# Patient Record
Sex: MALE | Race: BLACK OR AFRICAN AMERICAN | URBAN - METROPOLITAN AREA
[De-identification: names, ages, dates, MRNs, and addresses within clinical notes are randomized per-mention and may not be internally consistent; named-entity substitution may affect disease eponyms.]

---

## 2020-07-15 ENCOUNTER — HOSPITAL ENCOUNTER (OUTPATIENT)
Dept: INTENSIVE CARE | Facility: HOSPITAL | Age: 50
End: 2020-07-16
Attending: INTERNAL MEDICINE | Admitting: INTERNAL MEDICINE

## 2020-07-15 LAB
ABS NEUT (OLG): 5.17 X10(3)/MCL (ref 2.1–9.2)
ALBUMIN SERPL-MCNC: 3.8 GM/DL (ref 3.5–5)
ALBUMIN/GLOB SERPL: 1.1 RATIO (ref 1.1–2)
ALP SERPL-CCNC: 66 UNIT/L (ref 40–150)
ALT SERPL-CCNC: 25 UNIT/L (ref 0–55)
AMPHET UR QL SCN: NEGATIVE
APTT PPP: 52.3 SECOND(S) (ref 23.2–33.7)
AST SERPL-CCNC: 33 UNIT/L (ref 5–34)
BARBITURATE SCN PRESENT UR: NEGATIVE
BASOPHILS # BLD AUTO: 0 X10(3)/MCL (ref 0–0.2)
BASOPHILS NFR BLD AUTO: 0 %
BENZODIAZ UR QL SCN: NEGATIVE
BILIRUB SERPL-MCNC: 0.5 MG/DL
BILIRUBIN DIRECT+TOT PNL SERPL-MCNC: 0.1 MG/DL (ref 0–0.5)
BILIRUBIN DIRECT+TOT PNL SERPL-MCNC: 0.4 MG/DL (ref 0–0.8)
BUN SERPL-MCNC: 14.8 MG/DL (ref 8.4–25.7)
CALCIUM SERPL-MCNC: 8.9 MG/DL (ref 8.4–10.2)
CANNABINOIDS UR QL SCN: POSITIVE
CHLORIDE SERPL-SCNC: 103 MMOL/L (ref 98–107)
CHOLEST SERPL-MCNC: 197 MG/DL
CHOLEST/HDLC SERPL: 4 {RATIO} (ref 0–5)
CK SERPL-CCNC: 232 U/L (ref 30–200)
CO2 SERPL-SCNC: 23 MMOL/L (ref 22–29)
COCAINE UR QL SCN: POSITIVE
CREAT SERPL-MCNC: 0.85 MG/DL (ref 0.73–1.18)
CRP SERPL HS-MCNC: 0.67 MG/DL
EOSINOPHIL # BLD AUTO: 0.2 X10(3)/MCL (ref 0–0.9)
EOSINOPHIL NFR BLD AUTO: 2 %
ERYTHROCYTE [DISTWIDTH] IN BLOOD BY AUTOMATED COUNT: 13.5 % (ref 11.5–17)
ERYTHROCYTE [SEDIMENTATION RATE] IN BLOOD: 2 MM/HR (ref 0–15)
EST. AVERAGE GLUCOSE BLD GHB EST-MCNC: 102.5 MG/DL
GLOBULIN SER-MCNC: 3.6 GM/DL (ref 2.4–3.5)
GLUCOSE SERPL-MCNC: 67 MG/DL (ref 74–100)
HBA1C MFR BLD: 5.2 %
HCT VFR BLD AUTO: 50.4 % (ref 42–52)
HDLC SERPL-MCNC: 56 MG/DL (ref 35–60)
HGB BLD-MCNC: 16.7 GM/DL (ref 14–18)
INR PPP: 1 (ref 0–1.3)
LDLC SERPL CALC-MCNC: 123 MG/DL (ref 50–140)
LYMPHOCYTES # BLD AUTO: 1.8 X10(3)/MCL (ref 0.6–4.6)
LYMPHOCYTES NFR BLD AUTO: 24 %
MCH RBC QN AUTO: 29.8 PG (ref 27–31)
MCHC RBC AUTO-ENTMCNC: 33.1 GM/DL (ref 33–36)
MCV RBC AUTO: 89.8 FL (ref 80–94)
MONOCYTES # BLD AUTO: 0.5 X10(3)/MCL (ref 0.1–1.3)
MONOCYTES NFR BLD AUTO: 6 %
NEUTROPHILS # BLD AUTO: 5.17 X10(3)/MCL (ref 2.1–9.2)
NEUTROPHILS NFR BLD AUTO: 67 %
OPIATES UR QL SCN: NEGATIVE
PCP UR QL: NEGATIVE
PH UR STRIP.AUTO: 5 [PH] (ref 5–8)
PLATELET # BLD AUTO: 337 X10(3)/MCL (ref 130–400)
PMV BLD AUTO: 9.8 FL (ref 9.4–12.4)
POTASSIUM SERPL-SCNC: 4.4 MMOL/L (ref 3.5–5.1)
PROT SERPL-MCNC: 7.4 GM/DL (ref 6.4–8.3)
PROTHROMBIN TIME: 12.2 SECOND(S) (ref 11.1–13.7)
RBC # BLD AUTO: 5.61 X10(6)/MCL (ref 4.7–6.1)
SODIUM SERPL-SCNC: 136 MMOL/L (ref 136–145)
SP GR FLD REFRACTOMETRY: 1 (ref 1–1)
TRIGL SERPL-MCNC: 91 MG/DL (ref 34–140)
TROPONIN I SERPL-MCNC: 0 NG/ML (ref 0–0.04)
VLDLC SERPL CALC-MCNC: 18 MG/DL
WBC # SPEC AUTO: 7.7 X10(3)/MCL (ref 4.5–11.5)

## 2022-04-30 NOTE — ED PROVIDER NOTES
Patient:   Hair Nielsen             MRN: [id number]            FIN: [id number]               Age:   49 years     Sex:  Male     :  1970   Associated Diagnoses:   Hypertension; CVA (cerebrovascular accident); Hypertension; CVA (cerebrovascular accident)   Author:   Tommie MÉNDEZ, Kyler DEVINE      Basic Information   Time seen: Date & time 7/15/2020 12:06:00.   History source: Patient.   Arrival mode: Private vehicle, walking.   History limitation: None.   Additional information: Patient's physician(s): None, Chief Complaint from Nursing Triage Note : Chief Complaint   7/15/2020 12:04 CDT      Chief Complaint           c/o dizziness and intermittent numbness to L arm, L leg and lips since yesterday around 1600. denies numbness at this time; HX of HTN; reports has not taken HTN meds for over 1 year.  .      History of Present Illness   The patient presents with dizziness, Patient states intermittent numbness to his left arm and leg and his lips starting yesterday. states that he has had dizziness intermittently (debra, SOL). and Patient presents yesterday afternoon he was watching TV when he had an episode of perioral numbness and tingling as well as left upper extremity numbness and tingling and some weakness in the left side.  It lasted approximate half an hour with some dizziness.  This morning he woke up with similar symptoms.  He continues to have the numbness and tingling however he isn't having weakness anymore.  He has noticed that he has weakness with ambulation.  He was unable to ambulate from the car to the triage room and required a wheelchair.  He reports the majority of his recent symptoms have resolved by now.  About his lifestyle habits.  He does smoke and drink and do drugs.  His drugs of choice involve cocaine and marijuana use.  He does admit to his last use being yesterday..  The onset was 1  days ago.  The course/duration of symptoms is improving.  The character of symptoms is  lightheaded and off-balance.  The degree at present is minimal.  The exacerbating factor is Walking.  The relieving factor is lying down.  Risk factors consist of hypertension, smoking and Drug abuse.  Prior episodes: none and Yesterday was 1st episode.  Therapy today: none.  Associated symptoms: focal weakness Extremities, altered sensation (left, Extremities), denies chest pain, denies nausea, denies vomiting and denies headache.        Review of Systems   Constitutional symptoms:  Negative except as documented in HPI.   Skin symptoms:  Negative except as documented in HPI.   Eye symptoms:  Negative except as documented in HPI.   ENMT symptoms:  Negative except as documented in HPI.   Respiratory symptoms:  No shortness of breath,    Cardiovascular symptoms:  Negative except as documented in HPI.   Gastrointestinal symptoms:  Negative except as documented in HPI.   Genitourinary symptoms:  Negative except as documented in HPI.   Musculoskeletal symptoms:  Negative except as documented in HPI.   Neurologic symptoms:  Dizziness, numbness, tingling, weakness.    Psychiatric symptoms:  Negative except as documented in HPI.   Endocrine symptoms:  Negative except as documented in HPI.   Hematologic/Lymphatic symptoms:  Negative except as documented in HPI.   Allergy/immunologic symptoms:  Negative except as documented in HPI.      Health Status   Allergies:    Allergic Reactions (Selected)  No Known Allergies.      Past Medical/ Family/ Social History   Medical history:    Resolved  Hypertension ([id number]):  Resolved..   Surgical history:    No active procedure history items have been selected or recorded..   Family history:    No family history items have been selected or recorded..   Social history: Alcohol use: Regularly, Tobacco use: Regularly, Drug use: Cocaine, marijuana, Occupation: Unemployed, Family/social situation: Unmarried.      Physical Examination               Vital Signs   Vital Signs   7/15/2020 16:03  CDT      Peripheral Pulse Rate     67 bpm                             Respiratory Rate          18 br/min                             SpO2                      99 %                             Systolic Blood Pressure   167 mmHg  HI                             Diastolic Blood Pressure  115 mmHg  HI                             Mean Arterial Pressure, Cuff              132 mmHg    7/15/2020 12:04 CDT      Temperature Oral          36.9 DegC                             Temperature Oral (calculated)             98.42 DegF                             Peripheral Pulse Rate     68 bpm                             Respiratory Rate          20 br/min                             SpO2                      99 %                             Oxygen Therapy            Room air                             Systolic Blood Pressure   156 mmHg  HI                             Diastolic Blood Pressure  111 mmHg  HI  .   Oxygen saturation.   General:  Alert, no acute distress.    Skin:  Warm, pink, intact, normal for ethnicity.    Head:  Normocephalic, atraumatic.    Neck:  Supple, trachea midline, no tenderness.    Eye:  Pupils are equal, round and reactive to light, extraocular movements are intact, normal conjunctiva, vision unchanged.    Ears, nose, mouth and throat:  Oral mucosa moist, no pharyngeal erythema or exudate.    Cardiovascular:  Regular rate and rhythm, No murmur, Normal peripheral perfusion.    Respiratory:  Lungs are clear to auscultation, respirations are non-labored, breath sounds are equal.    Chest wall:  No tenderness.   Back:  Nontender.   Gastrointestinal:  Soft, Nontender, Non distended, Normal bowel sounds.    Neurological:  Alert and oriented to person, place, time, and situation, normal sensory observed, normal motor observed, normal speech observed, Patient actually is somewhat of an ataxic gait.  It is not with every step however over the span of 8-10 steps he had a little bit of a lurch and felt unbalanced.   Negative Romberg.  Able to single leg balance without disturbance, Cranial nerves II - XII: Intact, Coordination: Finger(s) to nose.    Lymphatics:  No lymphadenopathy.   Psychiatric:  Cooperative, appropriate mood & affect, normal judgment.       Medical Decision Making   Differential Diagnosis:  Dizziness, cerebral vascular accident, transient ischemic attack, hypertension.    Documents reviewed:  Emergency department nurses' notes.   Orders  Launch Orders   Pharmacy:  cloNIDine (Order): 0.1 mg, form: Tab, Oral, Once, first dose 7/15/2020 16:24 CDT, stop date 7/15/2020 16:24 CDT, STAT  Admit/Transfer/Discharge:  Place in Outpatient Observation (Order): 7/15/2020 16:24 CDT, Medical Unit Zeenat MÉNDEZ, Bernard S, No.   Results review:  Lab results : Lab View   7/15/2020 12:45 CDT      Sodium Lvl                136 mmol/L                             Potassium Lvl             4.4 mmol/L                             Chloride                  103 mmol/L                             CO2                       23 mmol/L                             Calcium Lvl               8.9 mg/dL                             Glucose Lvl               67 mg/dL  LOW                             BUN                       14.8 mg/dL                             Creatinine                0.85 mg/dL                             eGFR-AA                   >60  NA                             eGFR-DEVON                  >60  NA                             Bili Total                0.5 mg/dL                             Bili Direct               0.1 mg/dL                             Bili Indirect             0.40 mg/dL                             AST                       33 unit/L                             ALT                       25 unit/L                             Alk Phos                  66 unit/L                             Total Protein             7.4 gm/dL                             Albumin Lvl               3.8 gm/dL                              Globulin                  3.6 gm/dL  HI                             A/G Ratio                 1.1 ratio                             PT                        12.2 second(s)                             INR                       1.0                             PTT                       52.3 second(s)  HI                             WBC                       7.7 x10(3)/mcL                             RBC                       5.61 x10(6)/mcL                             Hgb                       16.7 gm/dL                             Hct                       50.4 %                             Platelet                  337 x10(3)/mcL                             MCV                       89.8 fL                             MCH                       29.8 pg                             MCHC                      33.1 gm/dL                             RDW                       13.5 %                             MPV                       9.8 fL                             Abs Neut                  5.17 x10(3)/mcL                             Neutro Auto               67 %  NA                             Lymph Auto                24 %  NA                             Mono Auto                 6 %  NA                             Eos Auto                  2 %  NA                             Abs Eos                   0.2 x10(3)/mcL                             Basophil Auto             0 %  NA                             Abs Neutro                5.17 x10(3)/mcL                             Abs Lymph                 1.8 x10(3)/mcL                             Abs Mono                  0.5 x10(3)/mcL                             Abs Baso                  0.0 x10(3)/mcL  .      Impression and Plan   Diagnosis   Hypertension (XFL45-LP I10)   CVA (cerebrovascular accident) (JOI65-YM I63.9)   Acute ataxia (AUD97-UP R27.8)   Abuse, drug or alcohol (WWX28-CC F19.10)   Plan   Condition: Stable.    Disposition: Place in Observation Unit.     Notes: Spoke with Dr. Reilly about patient and his symptoms.  Medicine was then called for evaluation for possible CVA.  Admitted to outpatient observation.  He was cautioned about lifestyle changes and about his current drug use history..       Addendum      Teaching-Supervisory Addendum-Brief   I participated in the following activities of this patients care: the medical history, the physical exam, medical decision making, the procedure.   I personally performed: supervision of the patient's care, the medical history, the physical exam, the medical decision making.   The case was discussed with: the physician assistant.   Notes: I have physically seen and evaluated this patient in conjunction with the mid-level provider.  The patient has a history of cocaine use.  Patient had episode of lightheaded dizziness left arm paresthesias.  Left leg weakness happened yesterday happened again today presents to merge department denies any problems current when evaluation is a pleasant slender polite black male he is awake and oriented is not appear to be toxic his heart regular rate and rhythm his lungs are clear his abdomen is benign neurologically finger to nose intact bilaterally heel to shin is intact bilaterally.  He has no pronator drift on exam he is 5 over 5  strength bilaterally review of the workup shows possible age indeterminate right side cerebral event could account for his left-sided symptoms consultations been obtained with hospitalist patient will be admitted and worked up as TIA/CVA.  Impression TIA/CVA, cocaine abuse., I have participated in the medical decision making for this patient. I have performed an independent limited focused physical evaluation as well as history. I concur with the above.  .

## 2022-05-04 NOTE — HISTORICAL [facility] CERNER
This is a historical note converted from [place]. Formatting and pictures may have been removed.  Please reference [facility] for original formatting and attached multimedia. Chief Complaint  c/o dizziness and intermittent numbness to L arm, L leg and lips since yesterday around 1600. denies numbness at this time; HX of HTN; reports has not taken HTN meds for over 1 year.  Reason for Consultation  CVA  History of Present Illness  49 year old male with a past medical history of HTN,?medical noncompliance, tobacco use and drug abuse presented to the ED on 7/15 with complaints of dizziness and left sided weakness/numbness. Patient reports that symptoms of left sided weakness and numbness began on 7/14 and lasted 30 minutes before resolving and returning the next morning upon waking. CT head showed age indeterminate lacunar infarct in the right internal capsule anterior limb/caudate nucleus. Neurology consulted for CVA.  ?  Today, patient is lying in bed. He reports that his symptoms of left sided weakness and numbness have resolved. He also reports that he has not taken his BP medication in a year.  Review of Systems  Except as documented, all other systems reviewed and are negative  ?  Physical Exam  Vitals & Measurements  T:?37.2? ?C (Oral)? TMIN:?36.6? ?C (Oral)? TMAX:?37.2? ?C (Oral)? HR:?71(Monitored)? RR:?12? BP:?151/98? SpO2:?100%? WT:?70?kg? BMI:?25.1?  GENERAL: NAD, calm, cooperative, appropriate  RESP: CTAB, symmetric chest expansion  HEART: RRR, S1, S2, no LE edema  MENTAL STATUS: Oriented x4, follows commands reliably  SPEECH/LANGUAGE: Clear, fluent, coherent  CN:  perr, gaze conjugate  No tactile or motor facial asymmetry  Motor: Muscle strength 5/5 BUE, BLE, No focal weakness  Cerebellar: No tremor or dysmetria  Sensory: Normal to tactile stim.  Gait: not observed  Memory: normal and thought process intact  SKIN: warm, dry, intact?  ?  Assessment/Plan  Impression and Plan:  Left sided weakness...CVA  workup  HTN...uncontrolled  Medical noncompliance  Smoker  Drug Abuse...cocaine and marijuana  ?  CT head:  Age-indeterminate lacunar infarct in the right internal capsule anterior limb/caudate nucleus. No hemorrhage or mass effect.?  ?  Stroke workup in progress:  -MRI: 1. No acute findings in the brain.  2. Chronic lacunar infarct in the right internal capsule anterior limb.  3. Mild changes of chronic microangiopathy  -CTA, ECHO, CUS ordered  -LDL: 123  -A1c: 5.2  ?   ASA naive...continue ASA?81 mg  Begin Atorvastatin  PT/OT/ST to evaluate  ?  Further recommendations?to follow by MD  ?   Problem List/Past Medical History  Ongoing  Cocaine abuse  Contusion of rib  Hypertension  Marijuana abuse  Medical non-compliance  Tobacco abuse  Historical  No qualifying data  Procedure/Surgical History  Application of long arm splint (shoulder to hand). (09/28/2013)  Application of splint (09/28/2013)  Application of short arm splint (forearm to hand); static. (08/21/2013)  Application of splint (08/21/2013)   Medications  Inpatient  aspirin 325 mg oral tablet, 325 mg= 1 tab(s), Oral, Daily  hydrALAZINE 20 mg/mL injectable solution, 10 mg= 0.5 mL, IV Push, q4hr, PRN  Home  lisinopril 10 mg oral tablet, 10 mg= 1 tab(s), Oral, Daily  Allergies  No Known Allergies  Social History  Abuse/Neglect  No, 07/16/2020  No, 07/15/2020  Alcohol - Low Risk, 03/10/2013  Current, Beer, 1-2 times per month, 03/10/2013  Substance Use - Denies Substance Abuse, 03/10/2013  Tobacco - High Risk, 03/10/2013  10 or more cigarettes (1/2 pack or more)/day in last 30 days, Yes, 07/16/2020  10 or more cigarettes (1/2 pack or more)/day in last 30 days, N/A, 07/15/2020  Current, Cigarettes, 10 per day., 03/10/2013  Lab Results  Labs Last 24 Hours?  ?Chemistry? Hematology/Coagulation?   Sodium Lvl: 136 mmol/L (07/15/20 12:45:00) PT: 12.2 second(s) (07/15/20 12:45:00)   Potassium Lvl: 4.4 mmol/L (07/15/20 12:45:00) INR: 1 (07/15/20 12:45:00)   Chloride: 103  mmol/L (07/15/20 12:45:00) PTT:?52.3 second(s)?High (07/15/20 12:45:00)   CO2: 23 mmol/L (07/15/20 12:45:00) WBC: 7.7 x10(3)/mcL (07/15/20 12:45:00)   Calcium Lvl: 8.9 mg/dL (07/15/20 12:45:00) RBC: 5.61 x10(6)/mcL (07/15/20 12:45:00)   Glucose Lvl:?67 mg/dL?Low (07/15/20 12:45:00) Hgb: 16.7 gm/dL (07/15/20 12:45:00)   EA.5 mg/dL (07/15/20 12:45:00) Hct: 50.4 % (07/15/20 12:45:00)   BUN: 14.8 mg/dL (07/15/20 12:45:00) Platelet: 337 x10(3)/mcL (07/15/20 12:45:00)   Creatinine: 0.85 mg/dL (07/15/20 12:45:00) MCV: 89.8 fL (07/15/20 12:45:00)   eGFR-AA: >60 (07/15/20 12:45:00) MCH: 29.8 pg (07/15/20 12:45:00)   eGFR-DEVON: >60 (07/15/20 12:45:00) MCHC: 33.1 gm/dL (07/15/20 12:45:00)   Bili Total: 0.5 mg/dL (07/15/20 12:45:00) RDW: 13.5 % (07/15/20 12:45:00)   Bili Direct: 0.1 mg/dL (07/15/20 12:45:00) MPV: 9.8 fL (07/15/20 12:45:00)   Bili Indirect: 0.4 mg/dL (07/15/20 12:45:00) Abs Neut: 5.17 x10(3)/mcL (07/15/20 12:45:00)   AST: 33 unit/L (07/15/20 12:45:00) Neutro Auto: 67 % (07/15/20 12:45:00)   ALT: 25 unit/L (07/15/20 12:45:00) Lymph Auto: 24 % (07/15/20 12:45:00)   Alk Phos: 66 unit/L (07/15/20 12:45:00) Mono Auto: 6 % (07/15/20 12:45:00)   Total Protein: 7.4 gm/dL (07/15/20 12:45:00) Eos Auto: 2 % (07/15/20 12:45:00)   Albumin Lvl: 3.8 gm/dL (07/15/20 12:45:00) Abs Eos: 0.2 x10(3)/mcL (07/15/20 12:45:00)   Globulin:?3.6 gm/dL?High (07/15/20 12:45:00) Basophil Auto: 0 % (07/15/20 12:45:00)   A/G Ratio: 1.1 ratio (07/15/20 12:45:00) Abs Neutro: 5.17 x10(3)/mcL (07/15/20 12:45:00)   Hgb A1c: 5.2 % (07/15/20 12:45:00) Abs Lymph: 1.8 x10(3)/mcL (07/15/20 12:45:00)   CRP High Sens:?0.67 mg/dL?High (07/15/20 12:45:00) Abs Mono: 0.5 x10(3)/mcL (07/15/20 12:45:00)   Chol: 197 mg/dL (07/15/20 12:45:00) Abs Baso: 0 x10(3)/mcL (07/15/20 12:45:00)   HDL: 56 mg/dL (07/15/20 12:45:00) Sed Rate: 2 mm/hr (07/15/20 12:45:00)   Tri mg/dL (07/15/20 12:45:00)    LDL: 123 mg/dL (07/15/20 12:45:00)    Chol/HDL: 4 (07/15/20  12:45:00)    VLDL: 18 (07/15/20 12:45:00)    Total CK:?232 U/L?High (07/15/20 12:45:00)    Troponin-I: 0.002 ng/mL (07/15/20 12:45:00)    Diagnostic Results  (07/15/2020 13:07 CDT CT Head W/O Contrast)  Reason For Exam  Neuro deficit, resolved;Other (please specify)  ?  Radiology Report  CT Head  ?  Indication: Dizziness, left arm numbness  ?  Comparison: None  ?  Findings:  ?  No hemorrhage, mass effect or CT evidence of acute cortical  infarction. There is an age indeterminate rounded hypodensity in the  right internal capsule anterior labrum along the margin of the caudate  nucleus. Ventricles and sulci are normal.?  ?  No abnormal extra-axial fluid collections.  ?  No hyperdense artery or vein.  ?  No skull fracture. Visualized paranasal sinuses and mastoid air cells  are clear.  ?  IMPRESSION:  ?  ?  1. Age-indeterminate lacunar infarct in the right internal capsule  anterior limb/caudate nucleus. No hemorrhage or mass effect. MRI  should be considered if clinically indicated (07/15/2020 18:41 CDT MRI Brain W/O Contrast)  ?  ?   Reason For Exam  CVA;Other (please specify)  ?  Radiology Report  MRI brain  ?  INDICATION: Left arm and leg numbness  ?  COMPARISON: CT head 7/15/2020  ?  ?  FINDINGS:  ?  There is a chronic lacunar infarct in the right internal capsule  anterior limb. No acute infarct, hemorrhage or mass effect. Minimal  periventricular and subcortical FLAIR hyperintensities elsewhere are  nonspecific but likely related to small vessel ischemic disease.  Ventricles and sulci are proportionate.  ?  No abnormal extraction fluid collections.  ?  Flow voids are maintained in the intracranial arteries and dural  venous sinuses.  ?  Calvarium has a normal signal.  ?  IMPRESSION:  1. No acute findings in the brain.  2. Chronic lacunar infarct in the right internal capsule anterior  limb.  3. Mild changes of chronic microangiopathy  ?      Pt seen and examined by me today.  he has hx of HTN, and cocaine use,  with postive UDS at admission, he came with L sided numbness and weakness and dizziness.  in my exam today, he is awake and alert, conversant,?no confusion,?no aphasia no dysarthria, visual field is intact, eye movements are intact, no?facial weakness, he has no sign of weakness or numbness?in the upper or the lower extremities bilaterally.  A/R  Small?ischemic stroke?of?right internal capsule,?of indeterminate age,?to me it looks?older than?2 weeks, but the patient adamant that his symptoms started on 13 July.  Etiology in my opinion?either?small vessel disease from?untreated high blood pressure?or cocaine use?or most likely?a combination of both.  -I had a lengthy discussion with the patient about the risks of?A. fib, ischemic stroke, hemorrhagic stroke, MIs?with cocaine?and sympathomimetic use,?I also discussed with him at length?the importance of treating high blood pressure.  -Patient will be discharged on aspirin 81, I also discussed with him that?if he has a hemorrhagic stroke secondary to cocaine use while he is on aspirin?it may be fatal.  -Blood pressure medication per primary.  -Atorvastatin 20/day.  -Follow-up with me in the clinic in 4 to 8 weeks.  ?  Abhi Reddy MD.  Vascular neurology.  ?  ?

## 2022-05-04 NOTE — HISTORICAL [facility] CERNER
This is a historical note converted from K[employer]. Formatting and pictures may have been removed.  Please reference Kervin for original formatting and attached multimedia. Chief Complaint  c/o dizziness and intermittent numbness to L arm, L leg and lips since yesterday around 1600. denies numbness at this time; HX of HTN; reports has not taken HTN meds for over 1 year.  History of Present Illness  Mr. Nielsen is a 49 year old male whose past medical hx includes HTN, medical noncompliance, tobacco, marijuana?and cocaine abuse. He presented to [facility] ED with c/o dizziness and weakness with associated numbness to the LUE, LLE and lips that began at 16:00 yesterday afternoon after he felt a pain in the back of his head.?Symptoms lasted about 1.5 hrs. Still reports weakness with ambulation.?Note, he has not taken his BP medications in over 1 year, previously on Lisinopril 10mg daily. He reported last cocaine and marijuana use was last night. Denied HA, vision changes, falling,?CP, SOB,?N/V/D, COVID exposure.  ?Initial ED VS include /111, HR 68, RR 20, SPO2 99%, temperature 36.9 ?C.? Labs notable for glucose 67.? CT head noted age-indeterminate lacunar infarct in the right internal capsule anterior limb/caudate nucleus, no hemorrhage or mass effect. He was admitted to hospitalist services for further work-up and management.  Review of Systems  Except as documented, all other systems reviewed and negative.?  Physical Exam  Vitals & Measurements  T:?36.9? ?C (Oral)? HR:?67(Peripheral)? RR:?18? BP:?167/115? SpO2:?99%?  General:?Cooperative; in no acute distress  Eye: PERRLA, EOMI, clear conjunctiva, eyelids normal  HENT:?normocephalic; atraumatic; hearing grossly intact  Neck: full range of motion, No JVD  Respiratory:?clear to auscultation bilaterally; non-labored respirations; symmetrical chest expansion  Cardiovascular:?regular rate and rhythm; no edema noted  Gastrointestinal:?soft, non-tender, non-distended with  normal bowel sounds; no rebound tenderness or guarding  Musculoskeletal:?moves all extremities; normal ROM, no deformities noted  Integumentary: warm and dry  Neurologic: Alert and oriented; 5/5 strength bilaterally  Assessment/Plan  1.?CVA (cerebrovascular accident)?I63.9  ?right internal capsule anterior limb/caudate nucleus  2.?Hypertension?I10  3.?Cocaine abuse?F14.10  4.?Marijuana abuse?F12.10  5.?Tobacco abuse?Z72.0  6.?Medical non-compliance?Z91.19  ?  ?  Plan:  Symptom onset was at 16:00 yesterday.  Stroke protocol initiated  Neurology consulted  ASA daily  MRI brain,?Bilateral Carotid US?and ECHO pending  Lipid panel, hemoglobin A1c, CRP, ESR, UDS pending  Hold antihypertensives to allow for permissive hypertension  PRN hydralazine as needed for SBP greater than 220 or DBP greater than 120  Avoid BB 2/2 cocaine use  PT/OT/SLP to evaluate and treat when appropriate  Neuro checks every 2 hours  Fall precautions  Resume other appropriate home medications  Nicotine patch if pt desires  ?   Source of history: Self. Medical record.?  Present at bedside: None.?  History limitation: None.  Provider information: PCP:?NONE  ?   DVT prophylaxis: SCDs for now  ?  I, Josie Luna, NP have reviewed and discussed this case with Dr. AMRIK Epperson.   Problem List/Past Medical History  HTN, medical noncompliance, tobacco abuse, marijuana?and cocaine use  Procedure/Surgical History  Application of long arm splint (shoulder to hand). (09/28/2013)  Application of splint (09/28/2013)  Application of short arm splint (forearm to hand); static. (08/21/2013)  Application of splint (08/21/2013)   Medications  Home  lisinopril 10 mg oral tablet, 10 mg= 1 tab(s), Oral, Daily  Allergies  No Known Allergies  Social History  Abuse/Neglect  No, 07/15/2020  Alcohol - Low Risk, 03/10/2013  Current, Beer, 1-2 times per month, 03/10/2013  Substance Use - Denies Substance Abuse, 03/10/2013  Tobacco - High Risk, 03/10/2013  10 or more cigarettes  (1/2 pack or more)/day in last 30 days, N/A, 07/15/2020  Marijuana and Cocaine use several times per week  Family History  Negative  Lab Results  Labs Last 24 Hours?  ?Chemistry? Hematology/Coagulation?   Sodium Lvl: 136 mmol/L (07/15/20 12:45:00) PT: 12.2 second(s) (07/15/20 12:45:00)   Potassium Lvl: 4.4 mmol/L (07/15/20 12:45:00) INR: 1 (07/15/20 12:45:00)   Chloride: 103 mmol/L (07/15/20 12:45:00) PTT:?52.3 second(s)?High (07/15/20 12:45:00)   CO2: 23 mmol/L (07/15/20 12:45:00) WBC: 7.7 x10(3)/mcL (07/15/20 12:45:00)   Calcium Lvl: 8.9 mg/dL (07/15/20 12:45:00) RBC: 5.61 x10(6)/mcL (07/15/20 12:45:00)   Glucose Lvl:?67 mg/dL?Low (07/15/20 12:45:00) Hgb: 16.7 gm/dL (07/15/20 12:45:00)   BUN: 14.8 mg/dL (07/15/20 12:45:00) Hct: 50.4 % (07/15/20 12:45:00)   Creatinine: 0.85 mg/dL (07/15/20 12:45:00) Platelet: 337 x10(3)/mcL (07/15/20 12:45:00)   eGFR-AA: >60 (07/15/20 12:45:00) MCV: 89.8 fL (07/15/20 12:45:00)   eGFR-DEVON: >60 (07/15/20 12:45:00) MCH: 29.8 pg (07/15/20 12:45:00)   Bili Total: 0.5 mg/dL (07/15/20 12:45:00) MCHC: 33.1 gm/dL (07/15/20 12:45:00)   Bili Direct: 0.1 mg/dL (07/15/20 12:45:00) RDW: 13.5 % (07/15/20 12:45:00)   Bili Indirect: 0.4 mg/dL (07/15/20 12:45:00) MPV: 9.8 fL (07/15/20 12:45:00)   AST: 33 unit/L (07/15/20 12:45:00) Abs Neut: 5.17 x10(3)/mcL (07/15/20 12:45:00)   ALT: 25 unit/L (07/15/20 12:45:00) Neutro Auto: 67 % (07/15/20 12:45:00)   Alk Phos: 66 unit/L (07/15/20 12:45:00) Lymph Auto: 24 % (07/15/20 12:45:00)   Total Protein: 7.4 gm/dL (07/15/20 12:45:00) Mono Auto: 6 % (07/15/20 12:45:00)   Albumin Lvl: 3.8 gm/dL (07/15/20 12:45:00) Eos Auto: 2 % (07/15/20 12:45:00)   Globulin:?3.6 gm/dL?High (07/15/20 12:45:00) Abs Eos: 0.2 x10(3)/mcL (07/15/20 12:45:00)   A/G Ratio: 1.1 ratio (07/15/20 12:45:00) Basophil Auto: 0 % (07/15/20 12:45:00)    Abs Neutro: 5.17 x10(3)/mcL (07/15/20 12:45:00)    Abs Lymph: 1.8 x10(3)/mcL (07/15/20 12:45:00)    Abs Mono: 0.5 x10(3)/mcL (07/15/20 12:45:00)     Abs Baso: 0 x10(3)/mcL (07/15/20 12:45:00)   Diagnostic Results  Accession:?[id number]  Order:?CT Head W/O Contrast  Report Dt/Tm:?07/15/2020 13:07  Report:?  CT Head  ?  Indication: Dizziness, left arm numbness  ?  Comparison: None  ?  Technique: Axial CT images were obtained through the head without  contrast. ?Coronal and sagittal reconstructions submitted and  interpreted. ? Total DLP 1399. Automated exposure control utilized.  ?  Findings:  ?  No hemorrhage, mass effect or CT evidence of acute cortical  infarction. There is an age indeterminate rounded hypodensity in the  right internal capsule anterior labrum along the margin of the caudate  nucleus. Ventricles and sulci are normal.?  ?  No abnormal extra-axial fluid collections.  ?  No hyperdense artery or vein.  ?  No skull fracture. Visualized paranasal sinuses and mastoid air cells  are clear.  ?  IMPRESSION:  ?  ?  1. Age-indeterminate lacunar infarct in the right internal capsule  anterior limb/caudate nucleus. No hemorrhage or mass effect. MRI  should be considered if clinically indicated  ?      I, Bernard Epperson MD, assumed care of this patient at?18:30 on 7/15/20.  ?  For this patient encounter, I reviewed the midlevel providers documentation, treatment plan, medical decision making and I had face-to-face time with this patient.  ?  History:  stroke  ?   Physical exam:  benign  no major focal deficit  Afebrile, on room air, and hemodynamically stable.  ?   Medical decision making:  Proceed with typical ischemic workup per protocol  ?

## 2022-05-04 NOTE — HISTORICAL [facility] CERNER
This is a historical note converted from Kervin. Formatting and pictures may have been removed.  Please reference Kervin for original formatting and attached multimedia. Admit and Discharge Dates  Admit Date: 07/15/2020  Discharge Date: 07/16/2020  Physicians  Attending Physician - Zeenat MÉNDEZ, Bernard RUSH  Admitting Physician - Zeenat MÉNDEZ, Bernard RUSH  Consulting Physician - Barry MÉNDEZ, Abhi  Consulting Physician - Kayli MÉNDEZ, PH.D, [facility]  Primary Care Physician - Physician MD, Non Staff  Discharge Diagnosis  1.?TIA on medication?G45.9  2.?Hypertension?I10,?Hypertension?I10  3.?Cocaine abuse?F14.10  4.?Marijuana abuse?F12.10  5.?Tobacco abuse?Z72.0  6.?Medical non-compliance?Z91.19  Surgical Procedures  No procedures recorded for this visit.  Immunizations  No immunizations recorded for this visit.  Hospital Course  Mr. Nielsen is a 49 year old male whose past medical hx includes HTN, medical noncompliance, tobacco, marijuana?and cocaine abuse. He presented to [facility] ED with c/o dizziness and weakness with associated numbness to the LUE, LLE and lips that began at 16:00 yesterday afternoon after he felt a pain in the back of his head.?Symptoms lasted about 1.5 hrs. Still reports weakness with ambulation.?Note, he has not taken his BP medications in over 1 year, previously on Lisinopril 10mg daily. He reported last cocaine and marijuana use was last night. Denied HA, vision changes, falling,?CP, SOB,?N/V/D, COVID exposure.  ?Initial ED VS include /111, HR 68, RR 20, SPO2 99%, temperature 36.9 ?C.? Labs notable for glucose 67.? CT head noted age-indeterminate lacunar infarct in the right internal capsule anterior limb/caudate nucleus, no hemorrhage or mass effect.He was admitted to hospitalist services for further work-up and management.  ?   patient was admitted for stroke work up, MRI was negative for any new?infarct. all his symptoms resolved. patient was found to have uncontrolled bp- started on low dose  hydrochlorothiazide, counselled to stop marijuana and cocaine , exercise, be medication compliant.  ?  ?  Significant Findings  Labs Last 48 hours?  ?Chemistry? Hematology/Coagulation?   Sodium Lvl: 136 mmol/L (07/15/20 12:45:00) PT: 12.2 second(s) (07/15/20 12:45:00)   Potassium Lvl: 4.4 mmol/L (07/15/20 12:45:00) INR: 1 (07/15/20 12:45:00)   Chloride: 103 mmol/L (07/15/20 12:45:00) PTT:?52.3 second(s)?High (07/15/20 12:45:00)   CO2: 23 mmol/L (07/15/20 12:45:00) WBC: 7.7 x10(3)/mcL (07/15/20 12:45:00)   Calcium Lvl: 8.9 mg/dL (07/15/20 12:45:00) RBC: 5.61 x10(6)/mcL (07/15/20 12:45:00)   Glucose Lvl:?67 mg/dL?Low (07/15/20 12:45:00) Hgb: 16.7 gm/dL (07/15/20 12:45:00)   EA.5 mg/dL (07/15/20 12:45:00) Hct: 50.4 % (07/15/20 12:45:00)   BUN: 14.8 mg/dL (07/15/20 12:45:00) Platelet: 337 x10(3)/mcL (07/15/20 12:45:00)   Creatinine: 0.85 mg/dL (07/15/20 12:45:00) MCV: 89.8 fL (07/15/20 12:45:00)   eGFR-AA: >60 (07/15/20 12:45:00) MCH: 29.8 pg (07/15/20 12:45:00)   eGFR-DEVON: >60 (07/15/20 12:45:00) MCHC: 33.1 gm/dL (07/15/20 12:45:00)   Bili Total: 0.5 mg/dL (07/15/20 12:45:00) RDW: 13.5 % (07/15/20 12:45:00)   Bili Direct: 0.1 mg/dL (07/15/20 12:45:00) MPV: 9.8 fL (07/15/20 12:45:00)   Bili Indirect: 0.4 mg/dL (07/15/20 12:45:00) Abs Neut: 5.17 x10(3)/mcL (07/15/20 12:45:00)   AST: 33 unit/L (07/15/20 12:45:00) Neutro Auto: 67 % (07/15/20 12:45:00)   ALT: 25 unit/L (07/15/20 12:45:00) Lymph Auto: 24 % (07/15/20 12:45:00)   Alk Phos: 66 unit/L (07/15/20 12:45:00) Mono Auto: 6 % (07/15/20 12:45:00)   Total Protein: 7.4 gm/dL (07/15/20 12:45:00) Eos Auto: 2 % (07/15/20 12:45:00)   Albumin Lvl: 3.8 gm/dL (07/15/20 12:45:00) Abs Eos: 0.2 x10(3)/mcL (07/15/20 12:45:00)   Globulin:?3.6 gm/dL?High (07/15/20 12:45:00) Basophil Auto: 0 % (07/15/20 12:45:00)   A/G Ratio: 1.1 ratio (07/15/20 12:45:00) Abs Neutro: 5.17 x10(3)/mcL (07/15/20 12:45:00)   Hgb A1c: 5.2 % (07/15/20 12:45:00) Abs Lymph: 1.8 x10(3)/mcL (07/15/20  12:45:00)   CRP High Sens:?0.67 mg/dL?High (07/15/20 12:45:00) Abs Mono: 0.5 x10(3)/mcL (07/15/20 12:45:00)   Chol: 197 mg/dL (07/15/20 12:45:00) Abs Baso: 0 x10(3)/mcL (07/15/20 12:45:00)   HDL: 56 mg/dL (07/15/20 12:45:00) Sed Rate: 2 mm/hr (07/15/20 12:45:00)   Tri mg/dL (07/15/20 12:45:00)    LDL: 123 mg/dL (07/15/20 12:45:00)    Chol/HDL: 4 (07/15/20 12:45:00)    VLDL: 18 (07/15/20 12:45:00)    Total CK:?232 U/L?High (07/15/20 12:45:00)    Troponin-I: 0.002 ng/mL (07/15/20 12:45:00)    U pH: 5 (07/15/20 06:20:00)    U Spec Grav: 1 (07/15/20 06:20:00)    ?  ?  -MRI: 1. No acute findings in the brain.  2. Chronic lacunar infarct in the right internal capsule anterior limb.  3. Mild changes of chronic microangiopathy  -LDL: 123  -A1c: 5.2  ?  ?  Time Spent on discharge  > 30 minutes  Objective  Vitals & Measurements  T:?36.6? ?C (Oral)? TMIN:?36.6? ?C (Oral)? TMAX:?37.2? ?C (Oral)? HR:?60(Peripheral)? RR:?18? BP:?150/98? SpO2:?100%? WT:?70?kg? BMI:?25.1?  Physical Exam  General: age appropriate, in no acute distress  Eye: pupils are equal , round and reactive to light?  HEENT: Normocephalic  Neck: Supple?  Respiratory; Lungs are clear to auscultation, respirations are non labored?  Cardiovascular: Normal rate and rhythm, s1s2 only no edema?  Gastrointestinal: soft , non tender, normal bowel sounds?  Integumentary: Warm,?  Neurologic: Alert, oriented , no focal deficits?  Psychiatric: cooperative  Patient Discharge Condition  stable  Discharge Disposition  home   Discharge Medication Reconciliation  Prescribed  aspirin (aspirin 325 mg oral tablet)?325 mg, Oral, Daily  atorvastatin (atorvastatin 40 mg oral tablet)?40 mg, Oral, Daily  Continue  lisinopril (lisinopril 10 mg oral tablet)?10 mg, Oral, Daily  Education and Orders Provided  Transient Ischemic Attack, Easy-to-Read  Stroke Prevention  How to Take Your Blood Pressure, Easy-to-Read  DASH Eating Plan  Hypertension, Easy-to-Read  Discharge - 20  10:58:00 CDT, Home?  Follow up  Abhi Reddy, on 08/13/2020  [facility]  ????  Please go for a hospital follow-up appointment in the next 1 to 2 weeks.